# Patient Record
Sex: MALE | Race: WHITE | NOT HISPANIC OR LATINO | Employment: FULL TIME | URBAN - METROPOLITAN AREA
[De-identification: names, ages, dates, MRNs, and addresses within clinical notes are randomized per-mention and may not be internally consistent; named-entity substitution may affect disease eponyms.]

---

## 2018-02-26 ENCOUNTER — HOSPITAL ENCOUNTER (EMERGENCY)
Facility: HOSPITAL | Age: 23
Discharge: HOME | End: 2018-02-26
Attending: EMERGENCY MEDICINE
Payer: COMMERCIAL

## 2018-02-26 VITALS
OXYGEN SATURATION: 100 % | TEMPERATURE: 97.4 F | BODY MASS INDEX: 17.9 KG/M2 | WEIGHT: 125 LBS | SYSTOLIC BLOOD PRESSURE: 117 MMHG | HEIGHT: 70 IN | DIASTOLIC BLOOD PRESSURE: 68 MMHG | RESPIRATION RATE: 18 BRPM | HEART RATE: 67 BPM

## 2018-02-26 DIAGNOSIS — R55 SYNCOPE: Primary | ICD-10-CM

## 2018-02-26 LAB
ALBUMIN SERPL BCP-MCNC: 4.1 G/DL (ref 3.5–5)
ALP SERPL-CCNC: 65 U/L (ref 46–116)
ALT SERPL W P-5'-P-CCNC: 20 U/L (ref 12–78)
ANION GAP SERPL CALCULATED.3IONS-SCNC: 14 MMOL/L (ref 4–13)
AST SERPL W P-5'-P-CCNC: 15 U/L (ref 5–45)
ATRIAL RATE: 58 BPM
ATRIAL RATE: 60 BPM
ATRIAL RATE: 94 BPM
BASOPHILS # BLD AUTO: 0.04 THOUSANDS/ΜL (ref 0–0.1)
BASOPHILS NFR BLD AUTO: 1 % (ref 0–1)
BILIRUB SERPL-MCNC: 0.5 MG/DL (ref 0.2–1)
BUN SERPL-MCNC: 20 MG/DL (ref 5–25)
CALCIUM SERPL-MCNC: 8.9 MG/DL (ref 8.3–10.1)
CHLORIDE SERPL-SCNC: 102 MMOL/L (ref 100–108)
CO2 SERPL-SCNC: 25 MMOL/L (ref 21–32)
CREAT SERPL-MCNC: 0.96 MG/DL (ref 0.6–1.3)
EOSINOPHIL # BLD AUTO: 0.04 THOUSAND/ΜL (ref 0–0.61)
EOSINOPHIL NFR BLD AUTO: 1 % (ref 0–6)
ERYTHROCYTE [DISTWIDTH] IN BLOOD BY AUTOMATED COUNT: 12.1 % (ref 11.6–15.1)
GFR SERPL CREATININE-BSD FRML MDRD: 113 ML/MIN/1.73SQ M
GLUCOSE SERPL-MCNC: 142 MG/DL (ref 65–140)
HCT VFR BLD AUTO: 44.9 % (ref 36.5–49.3)
HGB BLD-MCNC: 15.7 G/DL (ref 12–17)
LYMPHOCYTES # BLD AUTO: 0.73 THOUSANDS/ΜL (ref 0.6–4.47)
LYMPHOCYTES NFR BLD AUTO: 10 % (ref 14–44)
MCH RBC QN AUTO: 30.8 PG (ref 26.8–34.3)
MCHC RBC AUTO-ENTMCNC: 35 G/DL (ref 31.4–37.4)
MCV RBC AUTO: 88 FL (ref 82–98)
MONOCYTES # BLD AUTO: 0.51 THOUSAND/ΜL (ref 0.17–1.22)
MONOCYTES NFR BLD AUTO: 7 % (ref 4–12)
NEUTROPHILS # BLD AUTO: 6.32 THOUSANDS/ΜL (ref 1.85–7.62)
NEUTS SEG NFR BLD AUTO: 83 % (ref 43–75)
NRBC BLD AUTO-RTO: 0 /100 WBCS
P AXIS: 65 DEGREES
P AXIS: 67 DEGREES
P AXIS: 76 DEGREES
PLATELET # BLD AUTO: 284 THOUSANDS/UL (ref 149–390)
PMV BLD AUTO: 11.4 FL (ref 8.9–12.7)
POTASSIUM SERPL-SCNC: 3.1 MMOL/L (ref 3.5–5.3)
PR INTERVAL: 108 MS
PR INTERVAL: 110 MS
PR INTERVAL: 120 MS
PROT SERPL-MCNC: 7.8 G/DL (ref 6.4–8.2)
QRS AXIS: 85 DEGREES
QRS AXIS: 85 DEGREES
QRS AXIS: 86 DEGREES
QRSD INTERVAL: 100 MS
QRSD INTERVAL: 108 MS
QRSD INTERVAL: 98 MS
QT INTERVAL: 350 MS
QT INTERVAL: 368 MS
QT INTERVAL: 378 MS
QTC INTERVAL: 368 MS
QTC INTERVAL: 371 MS
QTC INTERVAL: 437 MS
RBC # BLD AUTO: 5.09 MILLION/UL (ref 3.88–5.62)
SODIUM SERPL-SCNC: 141 MMOL/L (ref 136–145)
T WAVE AXIS: 35 DEGREES
T WAVE AXIS: 56 DEGREES
T WAVE AXIS: 58 DEGREES
TROPONIN I SERPL-MCNC: <0.02 NG/ML
VENTRICULAR RATE: 58 BPM
VENTRICULAR RATE: 60 BPM
VENTRICULAR RATE: 94 BPM
WBC # BLD AUTO: 7.67 THOUSAND/UL (ref 4.31–10.16)

## 2018-02-26 PROCEDURE — 84484 ASSAY OF TROPONIN QUANT: CPT | Performed by: EMERGENCY MEDICINE

## 2018-02-26 PROCEDURE — 85025 COMPLETE CBC W/AUTO DIFF WBC: CPT | Performed by: EMERGENCY MEDICINE

## 2018-02-26 PROCEDURE — 93010 ELECTROCARDIOGRAM REPORT: CPT | Performed by: INTERNAL MEDICINE

## 2018-02-26 PROCEDURE — 93005 ELECTROCARDIOGRAM TRACING: CPT

## 2018-02-26 PROCEDURE — 36415 COLL VENOUS BLD VENIPUNCTURE: CPT | Performed by: EMERGENCY MEDICINE

## 2018-02-26 PROCEDURE — 99284 EMERGENCY DEPT VISIT MOD MDM: CPT

## 2018-02-26 PROCEDURE — 80053 COMPREHEN METABOLIC PANEL: CPT | Performed by: EMERGENCY MEDICINE

## 2018-02-26 PROCEDURE — 36415 COLL VENOUS BLD VENIPUNCTURE: CPT

## 2018-02-26 RX ORDER — POTASSIUM CHLORIDE 20 MEQ/1
40 TABLET, EXTENDED RELEASE ORAL ONCE
Status: COMPLETED | OUTPATIENT
Start: 2018-02-26 | End: 2018-02-26

## 2018-02-26 RX ADMIN — POTASSIUM CHLORIDE 40 MEQ: 1500 TABLET, EXTENDED RELEASE ORAL at 05:23

## 2018-02-26 NOTE — DISCHARGE INSTRUCTIONS
Syncope   WHAT YOU NEED TO KNOW:   Syncope is also called fainting or passing out  Syncope is a sudden, temporary loss of consciousness, followed by a fall from a standing or sitting position  Syncope ranges from not serious to a sign of a more serious condition that needs to be treated  You can control some health conditions that cause syncope  Your healthcare providers can help you create a plan to manage syncope and prevent episodes  DISCHARGE INSTRUCTIONS:   Seek care immediately if:   · You are bleeding because you hit your head when you fainted  · You suddenly have double vision, difficulty speaking, numbness, and cannot move your arms or legs  · You have chest pain and trouble breathing  · You vomit blood or material that looks like coffee grounds  · You see blood in your bowel movement  Contact your healthcare provider if:   · You have new or worsening symptoms  · You have another syncope episode  · You have a headache, fast heartbeat, or feel too dizzy to stand up  · You have questions or concerns about your condition or care  Follow up with your healthcare provider as directed:  Write down your questions so you remember to ask them during your visits  Manage syncope:   · Keep a record of your syncope episodes  Include your symptoms and your activity before and after the episode  The record can help your healthcare provider find the cause of your syncope and help you manage episodes  · Sit or lie down when needed  This includes when you feel dizzy, your throat is getting tight, and your vision changes  Raise your legs above the level of your heart  · Take slow, deep breaths if you start to breathe faster with anxiety or fear  This can help decrease dizziness and the feeling that you might faint  · Check your blood pressure often  This is important if you take medicine to lower your blood pressure   Check your blood pressure when you are lying down and when you are standing  Ask how often to check during the day  Keep a record of your blood pressure numbers  Your healthcare provider may use the record to help plan your treatment  Prevent a syncope episode:   · Move slowly and let yourself get used to one position before you move to another position  This is very important when you change from a lying or sitting position to a standing position  Take some deep breaths before you stand up from a lying position  Stand up slowly  Sudden movements may cause a fainting spell  Sit on the side of the bed or couch for a few minutes before you stand up  If you are on bedrest, try to be upright for about 2 hours each day, or as directed  Do not lock your legs if you are standing for a long period of time  Move your legs and bend your knees to keep blood flowing  · Follow your healthcare provider's recommendations  Your provider may  recommend that you drink more liquids to prevent dehydration  You may also need to have more salt to keep your blood pressure from dropping too low and causing syncope  Your provider will tell you how much liquid and sodium to have each day  · Watch for signs of low blood sugar  These include hunger, nervousness, sweating, and fast or fluttery heartbeats  Talk with your healthcare provider about ways to keep your blood sugar level steady  · Do not strain if you are constipated  You may faint if you strain to have a bowel movement  Walking is the best way to get your bowels moving  Eat foods high in fiber to make it easier to have a bowel movement  Good examples are high-fiber cereals, beans, vegetables, and whole-grain breads  Prune juice may help make bowel movements softer  · Be careful in hot weather  Heat can cause a syncope episode  Limit activity done outside on hot days  Physical activity in hot weather can lead to dehydration  This can cause an episode    © 2017 [employer] Information is for End User's use only and may not be sold, redistributed or otherwise used for commercial purposes  All illustrations and images included in CareNotes® are the copyrighted property of [employer]  or Arthur Bucio  The above information is an  only  It is not intended as medical advice for individual conditions or treatments  Talk to your doctor, nurse or pharmacist before following any medical regimen to see if it is safe and effective for you

## 2018-02-26 NOTE — ED PROVIDER NOTES
History  Chief Complaint   Patient presents with    Syncope     Per pt  he has had flu like symptoms for the past couple of days  He took a warm shower today he walked out to his parents and said he didnt feel right then he past out for 15 seconds Pt  states LOC     25 y o  male presents with a 1 episode of syncope described as sudden onset passing out that occurred while at home  The patient notes 1 episode immediately prior to coming to the ER  Patient describes nausea and lightheadedness immediately prior to the episode  The patient denies clear triggers but was in the shower immediately prior to the event  Patient also NyQuil prior to his shower  The patient denies exertion-related event  Patient affirms any aura to the event  The patient denies any preceding chest pain, palpitations, dyspnea, headache, abdominal pain, or back pain  Patient was nauseus but did not vomit  Patient's event was witnessed  The patient does not recall all of the events associated with the episode  There is no report of any abnormal movements, including head turning, during the episode  The patient was at baseline mental status immediately after the episode  Patient is currently at baseline  Patient denies any similar episodes  ROS: the patient affirms chills earlier today along with the nausea and lightheadedness  Recent URI symptoms that have improved  All symptoms currently resolved, patient currently asymptomatic  Denies any hematochezia or melena, nausea/vomiting at present, diarrhea, visual changes, gait abnormalities, focal neurologic deficits, headache, chest pain, abdominal pain, back/neck pain, palpitations, or dyspnea  All other review of systems reviewed and noted to be negative  The patient denies any history of congestive heart failure, valvular head disease, coronary artery disease, venous thrombotic disease, or cardiac arrhythmia  Patient denies any recent change in medications    Patient denies any use of alcohol or illicit drugs  Patient and parents denies any family history of sudden cardiac death  The patient affirms any recent illness  The patient denies any recent trauma  Objective:  PHYSICAL EXAM:  Constitutional :  Non-toxic  Well developed, well-nourished with no acute distress  Eyes:  PERRL, EOMI  No resting nystagmus or with gaze fixation  HENT: Atraumatic  Neck: no carotid bruit, no tenderness to palpitation  CV:  Regular rate and rhythm, no murmur  Peripheral pulses intact and equal   Respiratory:  Lungs clear to auscultation bilaterally  Abdomen:  Soft, non-tender, non-distended  Back:  No vertebral tenderness  Skin:  Normal color, warm and dry  Extremities:  Non-tender, no pedal edema  Neuro:  Alert and answers questions appropriately  Normal gait  Normal Romberg exam   No pronator drift  Normal finger to nose  Normal fine motor function with rapid finger movements  Normal hand tap  Cranial nerves II through XII grossly intact  Visual fields grossly intact  Upper and lower motor strength 5/5 and symmetric  Normal light touch sensory exam    Normal DTRs  MDM  Patient presenting with syncope with a broad differential     EKG obtained and demonstrates sinus rhythm with a P R of 110    There is no upsloping wave consistent with a delta wave  There is nonspecific ST findings  Repeat EKG with continued normal sinus rhythm with MO of 120  Continues to have no acute ST segment findings  iSTAT will be obtained to evaluate hemoglobin and hematocrit to identify potential for anemia (specifically hematocrit less than 30) due to high risk factor  Patient has a reassuring EKG with no signs of dysrhythmia  Patient has no history of CHF or history/signs of structural heart disease  Patient denies history of coronary artery disease    Patient denies dyspnea or chest pain, patient normotensive in the emergency department, patient denies any family history of sudden cardiac death  As such, patient does not have any high risk criteria of the ACEP clinical policy on Syncope or the [facility] Syncope Rule  Patient will be placed on cardiac monitoring and monitored in the ER for a brief period of observation  No events observed on telemetry while in the ER  Patient continues to feel asymptomatic while in the ER with no additional episodes  Patient ambulated without event  I have discussed follow up and return precautions with the patient in detail who understands and agrees with the plan for discharge with follow up  Patient agreed to return with repeat events or any additional symptoms  Syncope       None       History reviewed  No pertinent past medical history  History reviewed  No pertinent surgical history  History reviewed  No pertinent family history  I have reviewed and agree with the history as documented  Social History   Substance Use Topics    Smoking status: Never Smoker    Smokeless tobacco: Never Used    Alcohol use Not on file        Review of Systems   Cardiovascular: Positive for syncope         Physical Exam  ED Triage Vitals [02/26/18 0040]   Temperature Pulse Respirations Blood Pressure SpO2   (!) 97 4 °F (36 3 °C) 74 18 118/54 99 %      Temp Source Heart Rate Source Patient Position - Orthostatic VS BP Location FiO2 (%)   Oral Monitor Lying Right arm --      Pain Score       No Pain           Orthostatic Vital Signs  Vitals:    02/26/18 0040 02/26/18 0501   BP: 118/54 117/68   Pulse: 74 67   Patient Position - Orthostatic VS: Lying Lying       Physical Exam    ED Medications  Medications   potassium chloride (K-DUR,KLOR-CON) CR tablet 40 mEq (40 mEq Oral Given 2/26/18 0523)       Diagnostic Studies  Results Reviewed     Procedure Component Value Units Date/Time    Troponin I [[id number]]  (Normal) Collected:  02/26/18 0537    Lab Status:  Final result Specimen:  Blood from Arm, Left Updated:  02/26/18 0603     Troponin I <0 02 ng/mL     Narrative:         [employer] Chemistry analyzer 99% cutoff is > 0 04 ng/mL in network labs    o cTnI 99% cutoff is useful only when applied to patients in the clinical setting of myocardial ischemia  o cTnI 99% cutoff should be interpreted in the context of clinical history, ECG findings and possibly cardiac imaging to establish correct diagnosis  o cTnI 99% cutoff may be suggestive but clearly not indicative of a coronary event without the clinical setting of myocardial ischemia  Comprehensive metabolic panel [[id number]]  (Abnormal) Collected:  02/26/18 0237    Lab Status:  Final result Specimen:  Blood from Arm, Left Updated:  02/26/18 0301     Sodium 141 mmol/L      Potassium 3 1 (L) mmol/L      Chloride 102 mmol/L      CO2 25 mmol/L      Anion Gap 14 (H) mmol/L      BUN 20 mg/dL      Creatinine 0 96 mg/dL      Glucose 142 (H) mg/dL      Calcium 8 9 mg/dL      AST 15 U/L      ALT 20 U/L      Alkaline Phosphatase 65 U/L      Total Protein 7 8 g/dL      Albumin 4 1 g/dL      Total Bilirubin 0 50 mg/dL      eGFR 113 ml/min/1 73sq m     Narrative:         National Kidney Disease Education Program recommendations are as follows:  GFR calculation is accurate only with a steady state creatinine  Chronic Kidney disease less than 60 ml/min/1 73 sq  meters  Kidney failure less than 15 ml/min/1 73 sq  meters      CBC and differential [[id number]]  (Abnormal) Collected:  02/26/18 0237    Lab Status:  Final result Specimen:  Blood from Arm, Left Updated:  02/26/18 0246     WBC 7 67 Thousand/uL      RBC 5 09 Million/uL      Hemoglobin 15 7 g/dL      Hematocrit 44 9 %      MCV 88 fL      MCH 30 8 pg      MCHC 35 0 g/dL      RDW 12 1 %      MPV 11 4 fL      Platelets 656 Thousands/uL      nRBC 0 /100 WBCs      Neutrophils Relative 83 (H) %      Lymphocytes Relative 10 (L) %      Monocytes Relative 7 %      Eosinophils Relative 1 %      Basophils Relative 1 %      Neutrophils Absolute 6 32 Thousands/µL Lymphocytes Absolute 0 73 Thousands/µL      Monocytes Absolute 0 51 Thousand/µL      Eosinophils Absolute 0 04 Thousand/µL      Basophils Absolute 0 04 Thousands/µL                  No orders to display              Procedures  Procedures       Phone Contacts  ED Phone Contact    ED Course  ED Course as of Feb 26 0724   Mon Feb 26, 2018   0621   Monitor demonstrates 1 episode of bradycardia rate of 50  No other acute changes  Patient tolerated oral intake without vomiting     0706   Continue cardiac monitoring without additional findings  Discussed potential etiologies with the patient and the patient's mother  Discussed follow-up with family physician  Discussed return precautions in detail, including return to emergency room with any additional symptoms  Patient asymptomatic in the emergency room  12 Talked with Dr Nicholaus Rinne cardiology, evaluated EKG  Agrees no acute findings concerning for urgent intervention  MDM  CritCare Time    Disposition  Final diagnoses:   Syncope     Time reflects when diagnosis was documented in both MDM as applicable and the Disposition within this note     Time User Action Codes Description Comment    2/26/2018  7:06 AM Tiffani White Add [R55] Syncope       ED Disposition     ED Disposition Condition Comment    Discharge  Marifer Palomino discharge to home/self care  Condition at discharge: Stable        Follow-up Information     Follow up With Specialties Details Why Contact Info Additional Patt Carlos MD  Schedule an appointment as soon as possible for a visit in 2 days Follow-up and reassessment  [address]   [facility] [id number]  [address] Emergency Department Emergency Medicine Go to If symptoms worsen [address] ED, [address] [place], [place], [address]        Patient's Medications    No medications on file     No discharge procedures on file      ED Provider  Electronically Signed by           Jonn Hicks MD  02/26/18 5624

## 2021-04-01 DIAGNOSIS — Z23 ENCOUNTER FOR IMMUNIZATION: ICD-10-CM

## 2021-04-17 ENCOUNTER — IMMUNIZATIONS (OUTPATIENT)
Dept: FAMILY MEDICINE CLINIC | Facility: HOSPITAL | Age: 26
End: 2021-04-17

## 2021-04-17 DIAGNOSIS — Z23 ENCOUNTER FOR IMMUNIZATION: Primary | ICD-10-CM

## 2021-04-17 PROCEDURE — 91300 SARS-COV-2 / COVID-19 MRNA VACCINE (PFIZER-BIONTECH) 30 MCG: CPT

## 2021-04-17 PROCEDURE — 0001A SARS-COV-2 / COVID-19 MRNA VACCINE (PFIZER-BIONTECH) 30 MCG: CPT

## 2021-05-11 ENCOUNTER — IMMUNIZATIONS (OUTPATIENT)
Dept: FAMILY MEDICINE CLINIC | Facility: HOSPITAL | Age: 26
End: 2021-05-11

## 2021-05-11 DIAGNOSIS — Z23 ENCOUNTER FOR IMMUNIZATION: Primary | ICD-10-CM

## 2021-05-11 PROCEDURE — 91300 SARS-COV-2 / COVID-19 MRNA VACCINE (PFIZER-BIONTECH) 30 MCG: CPT

## 2021-05-11 PROCEDURE — 0002A SARS-COV-2 / COVID-19 MRNA VACCINE (PFIZER-BIONTECH) 30 MCG: CPT
